# Patient Record
Sex: FEMALE | Race: OTHER | HISPANIC OR LATINO
[De-identification: names, ages, dates, MRNs, and addresses within clinical notes are randomized per-mention and may not be internally consistent; named-entity substitution may affect disease eponyms.]

---

## 2018-11-05 ENCOUNTER — APPOINTMENT (OUTPATIENT)
Dept: NEUROSURGERY | Facility: CLINIC | Age: 38
End: 2018-11-05

## 2022-02-28 ENCOUNTER — EMERGENCY (EMERGENCY)
Facility: HOSPITAL | Age: 42
LOS: 1 days | Discharge: HOME | End: 2022-02-28
Attending: STUDENT IN AN ORGANIZED HEALTH CARE EDUCATION/TRAINING PROGRAM | Admitting: STUDENT IN AN ORGANIZED HEALTH CARE EDUCATION/TRAINING PROGRAM
Payer: MEDICAID

## 2022-02-28 VITALS
OXYGEN SATURATION: 100 % | DIASTOLIC BLOOD PRESSURE: 69 MMHG | SYSTOLIC BLOOD PRESSURE: 132 MMHG | HEART RATE: 73 BPM | TEMPERATURE: 98 F | RESPIRATION RATE: 18 BRPM

## 2022-02-28 PROCEDURE — 73030 X-RAY EXAM OF SHOULDER: CPT | Mod: 26,RT

## 2022-02-28 PROCEDURE — 73080 X-RAY EXAM OF ELBOW: CPT | Mod: 26,RT

## 2022-02-28 PROCEDURE — 73060 X-RAY EXAM OF HUMERUS: CPT | Mod: 26,RT

## 2022-02-28 PROCEDURE — 99284 EMERGENCY DEPT VISIT MOD MDM: CPT

## 2022-02-28 RX ORDER — KETOROLAC TROMETHAMINE 30 MG/ML
30 SYRINGE (ML) INJECTION ONCE
Refills: 0 | Status: DISCONTINUED | OUTPATIENT
Start: 2022-02-28 | End: 2022-02-28

## 2022-02-28 RX ORDER — CYCLOBENZAPRINE HYDROCHLORIDE 10 MG/1
10 TABLET, FILM COATED ORAL ONCE
Refills: 0 | Status: COMPLETED | OUTPATIENT
Start: 2022-02-28 | End: 2022-02-28

## 2022-02-28 RX ORDER — LIDOCAINE 4 G/100G
1 CREAM TOPICAL ONCE
Refills: 0 | Status: COMPLETED | OUTPATIENT
Start: 2022-02-28 | End: 2022-02-28

## 2022-02-28 RX ADMIN — Medication 30 MILLIGRAM(S): at 12:24

## 2022-02-28 RX ADMIN — LIDOCAINE 1 PATCH: 4 CREAM TOPICAL at 12:23

## 2022-02-28 RX ADMIN — CYCLOBENZAPRINE HYDROCHLORIDE 10 MILLIGRAM(S): 10 TABLET, FILM COATED ORAL at 12:24

## 2022-02-28 NOTE — ED PROVIDER NOTE - PATIENT PORTAL LINK FT
You can access the FollowMyHealth Patient Portal offered by [facility] by registering at the following website: [email or web address]. By joining [facility]’s FollowMyHealth portal, you will also be able to view your health information using other applications (apps) compatible with our system.

## 2022-02-28 NOTE — ED PROVIDER NOTE - PROGRESS NOTE DETAILS
improving, no acute findings on xr improving, no acute findings on xr  given sling for comfort and ortho fu

## 2022-02-28 NOTE — ED ADULT TRIAGE NOTE - CHIEF COMPLAINT QUOTE
Pt c/o right elbow and right shoulder pain with LROM since yesterday s/p mechanical fall, + capp refill noted. Denies PMH

## 2022-02-28 NOTE — ED PROVIDER NOTE - OBJECTIVE STATEMENT
40yo F otherwise healthy pw right shoulder pain after fall yesterday. pt tripped and fell and broke her fall by putting her arm behind her. now with pain in shoulder and diminished ROM of arm. took tylenol and motrin at 4am

## 2022-02-28 NOTE — ED PROVIDER NOTE - PHYSICAL EXAMINATION
Gen:  Well appearning in NAD  Head:  NC/AT  Resp: No distress   Ext: no deformities, + tenderness right shoulder, no deformity, + limited rom, can fully zamarripa elbow, + radial pulses, + sensatio intact, no wrist or hand tenderness   Skin: warm and dry as visualized

## 2022-02-28 NOTE — ED ADULT NURSE NOTE - OBJECTIVE STATEMENT
patient A&Ox4, ambulatory c/o R shoulder pain that began yesterday after a fall. patient states she tripped and put her arm behind her to catch her fall. patient has no PMH. patient appears uncomfortable. medication given and tolerated well per EMAR.

## 2022-03-03 PROBLEM — Z00.00 ENCOUNTER FOR PREVENTIVE HEALTH EXAMINATION: Status: ACTIVE | Noted: 2022-03-03

## 2022-03-08 ENCOUNTER — NON-APPOINTMENT (OUTPATIENT)
Age: 42
End: 2022-03-08

## 2022-03-08 ENCOUNTER — APPOINTMENT (OUTPATIENT)
Dept: ORTHOPEDIC SURGERY | Facility: CLINIC | Age: 42
End: 2022-03-08
Payer: MEDICAID

## 2022-03-08 VITALS
HEART RATE: 76 BPM | SYSTOLIC BLOOD PRESSURE: 116 MMHG | BODY MASS INDEX: 28.66 KG/M2 | WEIGHT: 146 LBS | HEIGHT: 60 IN | DIASTOLIC BLOOD PRESSURE: 75 MMHG

## 2022-03-08 PROCEDURE — 73030 X-RAY EXAM OF SHOULDER: CPT | Mod: RT

## 2022-03-08 PROCEDURE — 99204 OFFICE O/P NEW MOD 45 MIN: CPT

## 2022-03-31 ENCOUNTER — APPOINTMENT (OUTPATIENT)
Dept: MRI IMAGING | Facility: IMAGING CENTER | Age: 42
End: 2022-03-31
Payer: MEDICAID

## 2022-03-31 ENCOUNTER — OUTPATIENT (OUTPATIENT)
Dept: OUTPATIENT SERVICES | Facility: HOSPITAL | Age: 42
LOS: 1 days | End: 2022-03-31
Payer: MEDICAID

## 2022-03-31 DIAGNOSIS — M75.101 UNSPECIFIED ROTATOR CUFF TEAR OR RUPTURE OF RIGHT SHOULDER, NOT SPECIFIED AS TRAUMATIC: ICD-10-CM

## 2022-03-31 PROCEDURE — 73221 MRI JOINT UPR EXTREM W/O DYE: CPT

## 2022-03-31 PROCEDURE — 73221 MRI JOINT UPR EXTREM W/O DYE: CPT | Mod: 26,RT

## 2022-04-06 ENCOUNTER — NON-APPOINTMENT (OUTPATIENT)
Age: 42
End: 2022-04-06

## 2022-04-08 ENCOUNTER — APPOINTMENT (OUTPATIENT)
Dept: ORTHOPEDIC SURGERY | Facility: CLINIC | Age: 42
End: 2022-04-08
Payer: MEDICAID

## 2022-04-08 PROCEDURE — 99214 OFFICE O/P EST MOD 30 MIN: CPT

## 2022-04-16 ENCOUNTER — OUTPATIENT (OUTPATIENT)
Dept: OUTPATIENT SERVICES | Facility: HOSPITAL | Age: 42
LOS: 1 days | End: 2022-04-16
Payer: MEDICAID

## 2022-04-16 DIAGNOSIS — Z11.52 ENCOUNTER FOR SCREENING FOR COVID-19: ICD-10-CM

## 2022-04-16 DIAGNOSIS — M75.41 IMPINGEMENT SYNDROME OF RIGHT SHOULDER: ICD-10-CM

## 2022-04-16 DIAGNOSIS — M75.21 BICIPITAL TENDINITIS, RIGHT SHOULDER: ICD-10-CM

## 2022-04-16 LAB — SARS-COV-2 RNA SPEC QL NAA+PROBE: SIGNIFICANT CHANGE UP

## 2022-04-16 PROCEDURE — U0003: CPT

## 2022-04-16 PROCEDURE — U0005: CPT

## 2022-04-16 PROCEDURE — C9803: CPT

## 2022-04-17 ENCOUNTER — TRANSCRIPTION ENCOUNTER (OUTPATIENT)
Age: 42
End: 2022-04-17

## 2022-04-18 ENCOUNTER — OUTPATIENT (OUTPATIENT)
Dept: OUTPATIENT SERVICES | Facility: HOSPITAL | Age: 42
LOS: 1 days | End: 2022-04-18
Payer: MEDICAID

## 2022-04-18 ENCOUNTER — APPOINTMENT (OUTPATIENT)
Dept: ORTHOPEDIC SURGERY | Facility: HOSPITAL | Age: 42
End: 2022-04-18

## 2022-04-18 ENCOUNTER — TRANSCRIPTION ENCOUNTER (OUTPATIENT)
Age: 42
End: 2022-04-18

## 2022-04-18 VITALS
WEIGHT: 151.9 LBS | RESPIRATION RATE: 16 BRPM | HEART RATE: 68 BPM | DIASTOLIC BLOOD PRESSURE: 65 MMHG | SYSTOLIC BLOOD PRESSURE: 103 MMHG | HEIGHT: 60 IN | OXYGEN SATURATION: 100 % | TEMPERATURE: 98 F

## 2022-04-18 VITALS
SYSTOLIC BLOOD PRESSURE: 113 MMHG | DIASTOLIC BLOOD PRESSURE: 62 MMHG | TEMPERATURE: 98 F | HEART RATE: 94 BPM | RESPIRATION RATE: 14 BRPM | OXYGEN SATURATION: 100 %

## 2022-04-18 DIAGNOSIS — M75.21 BICIPITAL TENDINITIS, RIGHT SHOULDER: ICD-10-CM

## 2022-04-18 DIAGNOSIS — S46.011A STRAIN OF MUSCLE(S) AND TENDON(S) OF THE ROTATOR CUFF OF RIGHT SHOULDER, INITIAL ENCOUNTER: ICD-10-CM

## 2022-04-18 DIAGNOSIS — M75.41 IMPINGEMENT SYNDROME OF RIGHT SHOULDER: ICD-10-CM

## 2022-04-18 PROCEDURE — 29827 SHO ARTHRS SRG RT8TR CUF RPR: CPT | Mod: RT

## 2022-04-18 PROCEDURE — 29826 SHO ARTHRS SRG DECOMPRESSION: CPT | Mod: RT

## 2022-04-18 PROCEDURE — C1713: CPT

## 2022-04-18 DEVICE — ANCHOR SUT FOOTPRINT ULTRA PK: Type: IMPLANTABLE DEVICE | Site: RIGHT | Status: FUNCTIONAL

## 2022-04-18 DEVICE — IMPLANTABLE DEVICE: Type: IMPLANTABLE DEVICE | Site: RIGHT | Status: FUNCTIONAL

## 2022-04-18 RX ORDER — SODIUM CHLORIDE 9 MG/ML
3 INJECTION INTRAMUSCULAR; INTRAVENOUS; SUBCUTANEOUS EVERY 8 HOURS
Refills: 0 | Status: DISCONTINUED | OUTPATIENT
Start: 2022-04-18 | End: 2022-05-02

## 2022-04-18 RX ORDER — OXYCODONE AND ACETAMINOPHEN 5; 325 MG/1; MG/1
1 TABLET ORAL
Qty: 30 | Refills: 0
Start: 2022-04-18

## 2022-04-18 RX ORDER — SENNA PLUS 8.6 MG/1
2 TABLET ORAL
Qty: 28 | Refills: 0
Start: 2022-04-18 | End: 2022-05-01

## 2022-04-18 RX ORDER — CEPHALEXIN 500 MG
1 CAPSULE ORAL
Qty: 9 | Refills: 0
Start: 2022-04-18 | End: 2022-04-20

## 2022-04-18 RX ORDER — OXYCODONE HYDROCHLORIDE 5 MG/1
5 TABLET ORAL ONCE
Refills: 0 | Status: DISCONTINUED | OUTPATIENT
Start: 2022-04-18 | End: 2022-04-18

## 2022-04-18 RX ORDER — RIVAROXABAN 15 MG-20MG
2 KIT ORAL
Qty: 28 | Refills: 0
Start: 2022-04-18 | End: 2022-05-01

## 2022-04-18 RX ORDER — ACETAMINOPHEN 500 MG
1000 TABLET ORAL ONCE
Refills: 0 | Status: COMPLETED | OUTPATIENT
Start: 2022-04-18 | End: 2022-04-18

## 2022-04-18 RX ORDER — SODIUM CHLORIDE 9 MG/ML
1000 INJECTION, SOLUTION INTRAVENOUS
Refills: 0 | Status: DISCONTINUED | OUTPATIENT
Start: 2022-04-18 | End: 2022-05-02

## 2022-04-18 RX ORDER — CHLORHEXIDINE GLUCONATE 213 G/1000ML
1 SOLUTION TOPICAL DAILY
Refills: 0 | Status: DISCONTINUED | OUTPATIENT
Start: 2022-04-18 | End: 2022-05-02

## 2022-04-18 RX ORDER — DOCUSATE SODIUM 100 MG
1 CAPSULE ORAL
Qty: 28 | Refills: 0
Start: 2022-04-18 | End: 2022-05-01

## 2022-04-18 RX ORDER — ONDANSETRON 8 MG/1
4 TABLET, FILM COATED ORAL ONCE
Refills: 0 | Status: COMPLETED | OUTPATIENT
Start: 2022-04-18 | End: 2022-04-18

## 2022-04-18 RX ADMIN — Medication 1000 MILLIGRAM(S): at 16:50

## 2022-04-18 RX ADMIN — OXYCODONE HYDROCHLORIDE 5 MILLIGRAM(S): 5 TABLET ORAL at 16:52

## 2022-04-18 RX ADMIN — Medication 400 MILLIGRAM(S): at 16:25

## 2022-04-18 RX ADMIN — ONDANSETRON 4 MILLIGRAM(S): 8 TABLET, FILM COATED ORAL at 16:52

## 2022-04-18 NOTE — ASU PATIENT PROFILE, ADULT - LANGUAGE ASSISTANCE NEEDED
admitting nurse speaks Bengali SPIKE Whitney/Yes-Patient/Caregiver accepts free interpretation services...

## 2022-04-18 NOTE — BRIEF OPERATIVE NOTE - NSICDXBRIEFPROCEDURE_GEN_ALL_CORE_FT
PROCEDURES:  Decompression of subacromial space with rotator cuff repair 18-Apr-2022 16:15:34 right side Cyril Ward

## 2022-04-18 NOTE — ASU DISCHARGE PLAN (ADULT/PEDIATRIC) - PATIENT EDUCATION MATERIALS PROVIED
given to you by Dr. Puckett before surgery/Provider pre-printed instructions given/Pre-printed instructions given for nerve block

## 2022-04-18 NOTE — ASU DISCHARGE PLAN (ADULT/PEDIATRIC) - NURSING INSTRUCTIONS
******************************************************************************************  You may take TYLENOL (acetaminophen) after ________10:25_____ PM if needed for pain. DO NOT take any additional products containing TYLENOL or ACETAMINOPHEN, such as VICODIN, PERCOCET, NORCO, EXCEDRIN, and any over-the-counter cold medications until this time. DO NOT CONSUME MORE THAN 0828-5980 MG of TYLENOL (acetaminophen) in a 24-hour period.   ******************************************************************************************

## 2022-04-18 NOTE — PRE-ANESTHESIA EVALUATION ADULT - NSANTHOSAYNRD_GEN_A_CORE
No. SONALI screening performed.  STOP BANG Legend: 0-2 = LOW Risk; 3-4 = INTERMEDIATE Risk; 5-8 = HIGH Risk

## 2022-04-18 NOTE — PACU DISCHARGE NOTE - COMMENTS
The patient complained of pain but her arm is numb and immobile.  Oxycodone 5mg was administered and the patient felt better.  During discharge instructions, her left eye was noted to be red and she complained of a foreign body sensation consistent with corneal abrasion.  The PACU nurse noted that she had to be re-directed several times immediately post op to stop rubbing her eyes.    Tetracaine 0.5% was administered to the left eye x1 drop with relief.  Tobramycin ointment was given with instructions for use ("1 drop q4 hours x3 doses, if pain persists, call a private ophthalmologist or [facility] clinic - [phone]")

## 2022-04-18 NOTE — H&P ADULT - NSHPPHYSICALEXAM_GEN_ALL_CORE
Gen: NAD, alert and oriented  Resp: No distress, breathing well on room air  RUE: Skin intact  No obvious deformity  Pain with ROM of shoulder, limited abduction  Motor intact  SILT throughout  2+ radial pulse

## 2022-04-18 NOTE — H&P ADULT - ASSESSMENT
ASSESSMENT/PLAN:   NAM FALLON is a 41yFemale presenting with R shoulder rotator cuff tear, with plan to go to OR for R shoulder arthroscopy    - NPO   - Pain control  - Going to OR for arthroscopy  - NWB in sling post-op   ASSESSMENT/PLAN:   NAM FALLON is a 41yFemale presenting with R shoulder rotator cuff tear, with plan to go to OR for R shoulder arthroscopy    - NPO   - Pain control  - Going to OR for arthroscopy  - NWB in sling post-op      will be on xarelto postop for dvt ppx

## 2022-04-18 NOTE — ASU PATIENT PROFILE, ADULT - FALL HARM RISK - RISK INTERVENTIONS

## 2022-04-18 NOTE — H&P ADULT - HISTORY OF PRESENT ILLNESS
Pt is 41F presenting with shoulder pain after a fall in February. No fractures, but was found to have a full thciness rotator cuff tear. She was scheduled for surgery with Dr. Puckett and presents today for surgery

## 2022-04-18 NOTE — ASU DISCHARGE PLAN (ADULT/PEDIATRIC) - CARE PROVIDER_API CALL
Raul Puckett)  Orthopaedic Surgery  [address] [facility], [address] [place] [address]  Phone: [phone]  Fax: [phone]  Follow Up Time:

## 2022-04-18 NOTE — ASU DISCHARGE PLAN (ADULT/PEDIATRIC) - NS MD DC FALL RISK RISK
For information on Fall & Injury Prevention, visit: [email or web address] OR  [email or web address] OR  https://www.cdc.gov/steadi/patient.html

## 2022-04-18 NOTE — BRIEF OPERATIVE NOTE - OPERATION/FINDINGS
Right shoulder arthroscopy, debridement, subacromial decompression, and rotator cuff repair performed

## 2022-04-18 NOTE — H&P ADULT - NSICDXNOFAMILYHX_GEN_ALL_CORE
Click to add NO pertinent Family History

## 2022-04-27 ENCOUNTER — APPOINTMENT (OUTPATIENT)
Dept: ORTHOPEDIC SURGERY | Facility: CLINIC | Age: 42
End: 2022-04-27
Payer: MEDICAID

## 2022-04-27 VITALS — TEMPERATURE: 97.2 F

## 2022-04-27 DIAGNOSIS — M75.21 BICIPITAL TENDINITIS, RIGHT SHOULDER: ICD-10-CM

## 2022-04-27 DIAGNOSIS — M75.41 IMPINGEMENT SYNDROME OF RIGHT SHOULDER: ICD-10-CM

## 2022-04-27 DIAGNOSIS — S46.011A STRAIN OF MUSCLE(S) AND TENDON(S) OF THE ROTATOR CUFF OF RIGHT SHOULDER, INITIAL ENCOUNTER: ICD-10-CM

## 2022-04-27 PROCEDURE — 99024 POSTOP FOLLOW-UP VISIT: CPT

## 2022-05-03 RX ORDER — ASPIRIN/CALCIUM CARB/MAGNESIUM 324 MG
1 TABLET ORAL
Qty: 14 | Refills: 0
Start: 2022-05-03 | End: 2022-05-16

## 2022-05-06 ENCOUNTER — APPOINTMENT (OUTPATIENT)
Dept: ORTHOPEDIC SURGERY | Facility: CLINIC | Age: 42
End: 2022-05-06

## 2022-05-10 ENCOUNTER — APPOINTMENT (OUTPATIENT)
Dept: ORTHOPEDIC SURGERY | Facility: CLINIC | Age: 42
End: 2022-05-10
Payer: MEDICAID

## 2022-05-10 VITALS
DIASTOLIC BLOOD PRESSURE: 73 MMHG | WEIGHT: 143 LBS | BODY MASS INDEX: 28.07 KG/M2 | HEART RATE: 83 BPM | SYSTOLIC BLOOD PRESSURE: 119 MMHG | HEIGHT: 60 IN

## 2022-05-10 PROCEDURE — 99024 POSTOP FOLLOW-UP VISIT: CPT

## 2022-05-10 NOTE — HISTORY OF PRESENT ILLNESS
[de-identified] : Patient is status post Right Shoulder Arthroscopic surgery on 4-\par  Double row arthroscopic repair\par  [ Subacromial decompression ]\par \par \par The patient is doing well with improved pain \par \par The patient denies shortness of breath, chest pain, numbness tingling, worsening calf pain or swelling.\par \par Right Upper Ext\par \par Incisions are intact\par There is no erythema induration warmth or tenderness about the incisions\par There is mild swelling remaining [ and ecchymosis ]\par Shoulder pendulum motion is stiff\par FF 45\par ER -10\par Elbow ROM is [ full ]\par Motor is intact AIN/PIN/Ulnar/Radial\par Sensory intact median/ulnar/radial distributions\par Palpable radial pulse with brisk capillary refill\par \par \par \par Assessment Plan\par 3 week status post Right Shoulder Arthroscopic surgery on 4-\par  Double row arthroscopic repair\par  [ Subacromial decompression ]\par \par \par Continue postoperative exercises\par \par WB status RUE\par  Continue NWB for 6 weeks total\par  Progress 5lbs WB for weeks 6-12\par  Advance 15lbs WB for months 3-5\par  Activities as tolerated months 5+\par \par start physical therapy\par \par \par Follow up:\par 4 weeks

## 2022-06-21 ENCOUNTER — APPOINTMENT (OUTPATIENT)
Dept: ORTHOPEDIC SURGERY | Facility: CLINIC | Age: 42
End: 2022-06-21
Payer: MEDICAID

## 2022-06-21 DIAGNOSIS — M75.41 IMPINGEMENT SYNDROME OF RIGHT SHOULDER: ICD-10-CM

## 2022-06-21 DIAGNOSIS — M75.21 BICIPITAL TENDINITIS, RIGHT SHOULDER: ICD-10-CM

## 2022-06-21 PROCEDURE — 99024 POSTOP FOLLOW-UP VISIT: CPT

## 2022-06-21 NOTE — HISTORY OF PRESENT ILLNESS
[de-identified] : Patient is status post Right Shoulder Arthroscopic surgery on 4-  Double row arthroscopic repair  [ Subacromial decompression ]   The patient is doing well with improved pain   The patient denies shortness of breath, chest pain, numbness tingling, worsening calf pain or swelling.  Right Upper Ext  Incisions are intact There is no erythema induration warmth or tenderness about the incisions There is mild swelling remaining [ and ecchymosis ] Shoulder pendulum motion is stiff FF 45 ER -10 Elbow ROM is [ full ] Motor is intact AIN/PIN/Ulnar/Radial Sensory intact median/ulnar/radial distributions Palpable radial pulse with brisk capillary refill    Assessment Plan 3 week status post Right Shoulder Arthroscopic surgery on 4-  Double row arthroscopic repair  [ Subacromial decompression ]   Continue postoperative exercises  WB status RUE  Continue NWB for 6 weeks total  Progress 5lbs WB for weeks 6-12  Advance 15lbs WB for months 3-5  Activities as tolerated months 5+  start physical therapy   Follow up: 4 weeks

## 2022-08-19 ENCOUNTER — APPOINTMENT (OUTPATIENT)
Dept: ORTHOPEDIC SURGERY | Facility: CLINIC | Age: 42
End: 2022-08-19

## 2022-08-19 VITALS
SYSTOLIC BLOOD PRESSURE: 115 MMHG | BODY MASS INDEX: 28.07 KG/M2 | DIASTOLIC BLOOD PRESSURE: 72 MMHG | WEIGHT: 143 LBS | HEIGHT: 60 IN

## 2022-08-19 DIAGNOSIS — S46.011A STRAIN OF MUSCLE(S) AND TENDON(S) OF THE ROTATOR CUFF OF RIGHT SHOULDER, INITIAL ENCOUNTER: ICD-10-CM

## 2022-08-19 PROCEDURE — 99212 OFFICE O/P EST SF 10 MIN: CPT

## 2022-08-19 RX ORDER — DICLOFENAC SODIUM 50 MG/1
50 TABLET, DELAYED RELEASE ORAL
Qty: 60 | Refills: 1 | Status: COMPLETED | COMMUNITY
Start: 2022-05-10 | End: 2022-08-19

## 2022-08-19 RX ORDER — DICLOFENAC SODIUM 50 MG/1
50 TABLET, DELAYED RELEASE ORAL
Qty: 60 | Refills: 1 | Status: COMPLETED | COMMUNITY
Start: 2022-06-21 | End: 2022-08-19

## 2022-10-21 ENCOUNTER — APPOINTMENT (OUTPATIENT)
Dept: ORTHOPEDIC SURGERY | Facility: CLINIC | Age: 42
End: 2022-10-21

## 2022-10-21 VITALS
SYSTOLIC BLOOD PRESSURE: 106 MMHG | DIASTOLIC BLOOD PRESSURE: 71 MMHG | BODY MASS INDEX: 28.07 KG/M2 | HEIGHT: 60 IN | WEIGHT: 143 LBS

## 2022-10-21 PROCEDURE — 99213 OFFICE O/P EST LOW 20 MIN: CPT

## 2023-04-21 ENCOUNTER — APPOINTMENT (OUTPATIENT)
Dept: ORTHOPEDIC SURGERY | Facility: CLINIC | Age: 43
End: 2023-04-21
Payer: MEDICAID

## 2023-04-21 VITALS
SYSTOLIC BLOOD PRESSURE: 101 MMHG | HEIGHT: 60 IN | WEIGHT: 150 LBS | HEART RATE: 72 BPM | DIASTOLIC BLOOD PRESSURE: 66 MMHG | BODY MASS INDEX: 29.45 KG/M2

## 2023-04-21 DIAGNOSIS — Z98.890 OTHER SPECIFIED POSTPROCEDURAL STATES: ICD-10-CM

## 2023-04-21 PROCEDURE — 20610 DRAIN/INJ JOINT/BURSA W/O US: CPT | Mod: RT

## 2023-04-21 PROCEDURE — 99212 OFFICE O/P EST SF 10 MIN: CPT | Mod: 25

## 2023-09-29 ENCOUNTER — EMERGENCY (EMERGENCY)
Facility: HOSPITAL | Age: 43
LOS: 1 days | Discharge: HOME | End: 2023-09-29
Admitting: STUDENT IN AN ORGANIZED HEALTH CARE EDUCATION/TRAINING PROGRAM
Payer: MEDICAID

## 2023-09-29 VITALS
OXYGEN SATURATION: 99 % | RESPIRATION RATE: 16 BRPM | TEMPERATURE: 98 F | HEART RATE: 70 BPM | WEIGHT: 154.98 LBS | SYSTOLIC BLOOD PRESSURE: 113 MMHG | DIASTOLIC BLOOD PRESSURE: 64 MMHG

## 2023-09-29 PROCEDURE — 99284 EMERGENCY DEPT VISIT MOD MDM: CPT

## 2023-09-29 RX ORDER — ACETAMINOPHEN 500 MG
1000 TABLET ORAL ONCE
Refills: 0 | Status: COMPLETED | OUTPATIENT
Start: 2023-09-29 | End: 2023-09-29

## 2023-09-29 RX ORDER — MAGNESIUM SULFATE 500 MG/ML
1 VIAL (ML) INJECTION ONCE
Refills: 0 | Status: COMPLETED | OUTPATIENT
Start: 2023-09-29 | End: 2023-09-29

## 2023-09-29 RX ORDER — SODIUM CHLORIDE 9 MG/ML
1000 INJECTION INTRAMUSCULAR; INTRAVENOUS; SUBCUTANEOUS ONCE
Refills: 0 | Status: COMPLETED | OUTPATIENT
Start: 2023-09-29 | End: 2023-09-29

## 2023-09-29 RX ORDER — KETOROLAC TROMETHAMINE 30 MG/ML
15 SYRINGE (ML) INJECTION ONCE
Refills: 0 | Status: DISCONTINUED | OUTPATIENT
Start: 2023-09-29 | End: 2023-09-29

## 2023-09-29 RX ORDER — DIPHENHYDRAMINE HCL 50 MG
25 CAPSULE ORAL ONCE
Refills: 0 | Status: COMPLETED | OUTPATIENT
Start: 2023-09-29 | End: 2023-09-29

## 2023-09-29 RX ADMIN — Medication 300 GRAM(S): at 21:50

## 2023-09-29 RX ADMIN — Medication 25 MILLIGRAM(S): at 21:49

## 2023-09-29 RX ADMIN — Medication 400 MILLIGRAM(S): at 21:50

## 2023-09-29 RX ADMIN — SODIUM CHLORIDE 1000 MILLILITER(S): 9 INJECTION INTRAMUSCULAR; INTRAVENOUS; SUBCUTANEOUS at 21:49

## 2023-09-29 RX ADMIN — Medication 15 MILLIGRAM(S): at 21:50

## 2023-09-29 NOTE — ED PROVIDER NOTE - PHYSICAL EXAMINATION
CONSTITUTIONAL: Well-appearing; well-nourished; in no apparent distress. Non-toxic appearing.   NEURO: Alert, oriented x 3. Gait steady without assistance. No facial droop. Tongue protrudes midline. Strength to upper and lower extremities 5/5. No pronator drip. Finger to nose smooth and accurate b/l.   PSYCH: Mood appropriate. Thought processes intact.   NECK: Supple. FROM.   CARD: Regular rate and rhythm, no murmurs  RESP: No accessory muscle use; breath sounds clear and equal bilaterally; no wheezes, rhonchi, or rales     ABD: Soft; non-distended; non-tender. No guarding or rebound.   MUSCULOSKELETAL/EXTREMITIES: FROM in all four extremities; no extremity edema.  SKIN: Warm; dry; no apparent lesions or exudate

## 2023-09-29 NOTE — ED PROVIDER NOTE - CLINICAL SUMMARY MEDICAL DECISION MAKING FREE TEXT BOX
42-year-old female with no pertinent past medical history presents from home for evaluation of frontal headache for 1 week.  Patient notes that it started gradually and waxes and wanes with use of Tylenol but has not fully resolved.  Patient denies worsening of symptoms with change in position.  Patient denies associated photo/phonophobia, nausea, vomiting, visual changes, weakness, paresthesias, trauma, abdominal pain, nausea, vomiting, fever, chills, or neck stiffness.  Patient denies recent travel other than to the [place] greater than 4 weeks ago.  Patient denies taking medication daily.  No other voiced complaints. Of note, patient states that she had similar migraine about 3 years ago and went to her doctor but was told that it was improved with over-the-counter medications and it did and has only had intermittent headaches since then that usually resolves with over-the-counter medication.  VSS. Exam as noted above; no neuro deficits. Will give migraine protocol consisting of IV Toradol, Benadryl, Ofirmev, and magnesium and reassess.  Follow progress notes, dispo pending.  Low suspicion for TIA/stroke, SAH, malignancy more likely migraine and/or tension type headache.

## 2023-09-29 NOTE — ED PROVIDER NOTE - NSFOLLOWUPCLINICS_GEN_ALL_ED_FT
[facility]  Neurology  [address] [place] [address] [address] [place] [address]  Phone: [phone]  Fax:

## 2023-09-29 NOTE — ED PROVIDER NOTE - OBJECTIVE STATEMENT
42-year-old female with no pertinent past medical history presents from home for evaluation of frontal headache for 1 week.  Patient notes that it started gradually and waxes and wanes with use of Tylenol but has not fully resolved.  Patient denies worsening of symptoms with change in position.  Patient denies associated photo/phonophobia, nausea, vomiting, visual changes, weakness, paresthesias, trauma, abdominal pain, nausea, vomiting, fever, chills, or neck stiffness.  Patient denies recent travel other than to the [place] greater than 4 weeks ago.  Patient denies taking medication daily.  No other voiced complaints.

## 2023-09-29 NOTE — ED ADULT TRIAGE NOTE - CHIEF COMPLAINT QUOTE
pt c/o worsening headache x 1 week. has taken Tylenol and Advil with minimal relief and return of symptoms (last dose at noon). pt c/o throbbing pain behind left eye. denies vision changes, nausea, vomiting, dizziness. denies Phx.

## 2023-09-29 NOTE — ED ADULT NURSE NOTE - NSFALLRISKFACTORS_ED_ALL_ED
repositioned/darkened lights/plan of care explained/po fluids offered/warm blanket provided assisted to bathroom/po fluids offered/repositioned/side rails up/plan of care explained No indicators present

## 2023-09-29 NOTE — ED ADULT NURSE NOTE - OBJECTIVE STATEMENT
pt. received to int. 10B A&Ox4 ambulatory p/w HA. pt. endorsess x1 week of HA not relived since it first started. denies dizziness, blurry vision, lightheadedness. Denies n/v. Denies photophobia, pupils PERRLA. no neurological deficits noted. NAD noted. respirations even and unlabored on RA. 18g placed in the R AC, due meds given, no hcg needed as per ALTAF Call. comfort measures provided. safety precautions maintained.

## 2023-09-29 NOTE — ED PROVIDER NOTE - PROGRESS NOTE DETAILS
ALTAF Call: JENNINGS completely resolved per patient. Sleeping comfortably in no distress; pt and  counseled regarding signs/symptoms to return to the ED. Pt and  verbalized understanding.

## 2023-09-30 VITALS
HEART RATE: 64 BPM | SYSTOLIC BLOOD PRESSURE: 116 MMHG | RESPIRATION RATE: 17 BRPM | OXYGEN SATURATION: 98 % | DIASTOLIC BLOOD PRESSURE: 66 MMHG | TEMPERATURE: 98 F

## 2023-09-30 PROBLEM — Z78.9 OTHER SPECIFIED HEALTH STATUS: Chronic | Status: ACTIVE | Noted: 2022-02-28

## 2023-09-30 RX ORDER — DEXAMETHASONE 0.5 MG/5ML
10 ELIXIR ORAL ONCE
Refills: 0 | Status: COMPLETED | OUTPATIENT
Start: 2023-09-30 | End: 2023-09-30

## 2023-09-30 RX ADMIN — Medication 102 MILLIGRAM(S): at 01:31

## 2023-12-15 ENCOUNTER — EMERGENCY (EMERGENCY)
Facility: HOSPITAL | Age: 43
LOS: 1 days | Discharge: HOME | End: 2023-12-15
Admitting: STUDENT IN AN ORGANIZED HEALTH CARE EDUCATION/TRAINING PROGRAM
Payer: MEDICAID

## 2023-12-15 VITALS
SYSTOLIC BLOOD PRESSURE: 125 MMHG | HEART RATE: 118 BPM | DIASTOLIC BLOOD PRESSURE: 74 MMHG | OXYGEN SATURATION: 97 % | RESPIRATION RATE: 18 BRPM | TEMPERATURE: 102 F

## 2023-12-15 VITALS
OXYGEN SATURATION: 99 % | DIASTOLIC BLOOD PRESSURE: 66 MMHG | SYSTOLIC BLOOD PRESSURE: 114 MMHG | TEMPERATURE: 101 F | HEART RATE: 107 BPM | RESPIRATION RATE: 18 BRPM

## 2023-12-15 LAB
ALBUMIN SERPL ELPH-MCNC: 4.1 G/DL — SIGNIFICANT CHANGE UP (ref 3.3–5)
ALBUMIN SERPL ELPH-MCNC: 4.1 G/DL — SIGNIFICANT CHANGE UP (ref 3.3–5)
ALP SERPL-CCNC: 71 U/L — SIGNIFICANT CHANGE UP (ref 40–120)
ALP SERPL-CCNC: 71 U/L — SIGNIFICANT CHANGE UP (ref 40–120)
ALT FLD-CCNC: 26 U/L — SIGNIFICANT CHANGE UP (ref 4–33)
ALT FLD-CCNC: 26 U/L — SIGNIFICANT CHANGE UP (ref 4–33)
ANION GAP SERPL CALC-SCNC: 12 MMOL/L — SIGNIFICANT CHANGE UP (ref 7–14)
ANION GAP SERPL CALC-SCNC: 12 MMOL/L — SIGNIFICANT CHANGE UP (ref 7–14)
AST SERPL-CCNC: 23 U/L — SIGNIFICANT CHANGE UP (ref 4–32)
AST SERPL-CCNC: 23 U/L — SIGNIFICANT CHANGE UP (ref 4–32)
BASOPHILS # BLD AUTO: 0.03 K/UL — SIGNIFICANT CHANGE UP (ref 0–0.2)
BASOPHILS # BLD AUTO: 0.03 K/UL — SIGNIFICANT CHANGE UP (ref 0–0.2)
BASOPHILS NFR BLD AUTO: 0.5 % — SIGNIFICANT CHANGE UP (ref 0–2)
BASOPHILS NFR BLD AUTO: 0.5 % — SIGNIFICANT CHANGE UP (ref 0–2)
BILIRUB SERPL-MCNC: 0.3 MG/DL — SIGNIFICANT CHANGE UP (ref 0.2–1.2)
BILIRUB SERPL-MCNC: 0.3 MG/DL — SIGNIFICANT CHANGE UP (ref 0.2–1.2)
BUN SERPL-MCNC: 6 MG/DL — LOW (ref 7–23)
BUN SERPL-MCNC: 6 MG/DL — LOW (ref 7–23)
CALCIUM SERPL-MCNC: 8.8 MG/DL — SIGNIFICANT CHANGE UP (ref 8.4–10.5)
CALCIUM SERPL-MCNC: 8.8 MG/DL — SIGNIFICANT CHANGE UP (ref 8.4–10.5)
CHLORIDE SERPL-SCNC: 99 MMOL/L — SIGNIFICANT CHANGE UP (ref 98–107)
CHLORIDE SERPL-SCNC: 99 MMOL/L — SIGNIFICANT CHANGE UP (ref 98–107)
CO2 SERPL-SCNC: 23 MMOL/L — SIGNIFICANT CHANGE UP (ref 22–31)
CO2 SERPL-SCNC: 23 MMOL/L — SIGNIFICANT CHANGE UP (ref 22–31)
CREAT SERPL-MCNC: 0.75 MG/DL — SIGNIFICANT CHANGE UP (ref 0.5–1.3)
CREAT SERPL-MCNC: 0.75 MG/DL — SIGNIFICANT CHANGE UP (ref 0.5–1.3)
EGFR: 102 ML/MIN/1.73M2 — SIGNIFICANT CHANGE UP
EGFR: 102 ML/MIN/1.73M2 — SIGNIFICANT CHANGE UP
EOSINOPHIL # BLD AUTO: 0.03 K/UL — SIGNIFICANT CHANGE UP (ref 0–0.5)
EOSINOPHIL # BLD AUTO: 0.03 K/UL — SIGNIFICANT CHANGE UP (ref 0–0.5)
EOSINOPHIL NFR BLD AUTO: 0.5 % — SIGNIFICANT CHANGE UP (ref 0–6)
EOSINOPHIL NFR BLD AUTO: 0.5 % — SIGNIFICANT CHANGE UP (ref 0–6)
FLUAV AG NPH QL: DETECTED
FLUAV AG NPH QL: DETECTED
FLUBV AG NPH QL: SIGNIFICANT CHANGE UP
FLUBV AG NPH QL: SIGNIFICANT CHANGE UP
GLUCOSE SERPL-MCNC: 96 MG/DL — SIGNIFICANT CHANGE UP (ref 70–99)
GLUCOSE SERPL-MCNC: 96 MG/DL — SIGNIFICANT CHANGE UP (ref 70–99)
HCT VFR BLD CALC: 38.7 % — SIGNIFICANT CHANGE UP (ref 34.5–45)
HCT VFR BLD CALC: 38.7 % — SIGNIFICANT CHANGE UP (ref 34.5–45)
HGB BLD-MCNC: 12.9 G/DL — SIGNIFICANT CHANGE UP (ref 11.5–15.5)
HGB BLD-MCNC: 12.9 G/DL — SIGNIFICANT CHANGE UP (ref 11.5–15.5)
IANC: 4.82 K/UL — SIGNIFICANT CHANGE UP (ref 1.8–7.4)
IANC: 4.82 K/UL — SIGNIFICANT CHANGE UP (ref 1.8–7.4)
IMM GRANULOCYTES NFR BLD AUTO: 0.6 % — SIGNIFICANT CHANGE UP (ref 0–0.9)
IMM GRANULOCYTES NFR BLD AUTO: 0.6 % — SIGNIFICANT CHANGE UP (ref 0–0.9)
LYMPHOCYTES # BLD AUTO: 0.81 K/UL — LOW (ref 1–3.3)
LYMPHOCYTES # BLD AUTO: 0.81 K/UL — LOW (ref 1–3.3)
LYMPHOCYTES # BLD AUTO: 12.4 % — LOW (ref 13–44)
LYMPHOCYTES # BLD AUTO: 12.4 % — LOW (ref 13–44)
MCHC RBC-ENTMCNC: 28.2 PG — SIGNIFICANT CHANGE UP (ref 27–34)
MCHC RBC-ENTMCNC: 28.2 PG — SIGNIFICANT CHANGE UP (ref 27–34)
MCHC RBC-ENTMCNC: 33.3 GM/DL — SIGNIFICANT CHANGE UP (ref 32–36)
MCHC RBC-ENTMCNC: 33.3 GM/DL — SIGNIFICANT CHANGE UP (ref 32–36)
MCV RBC AUTO: 84.7 FL — SIGNIFICANT CHANGE UP (ref 80–100)
MCV RBC AUTO: 84.7 FL — SIGNIFICANT CHANGE UP (ref 80–100)
MONOCYTES # BLD AUTO: 0.8 K/UL — SIGNIFICANT CHANGE UP (ref 0–0.9)
MONOCYTES # BLD AUTO: 0.8 K/UL — SIGNIFICANT CHANGE UP (ref 0–0.9)
MONOCYTES NFR BLD AUTO: 12.3 % — SIGNIFICANT CHANGE UP (ref 2–14)
MONOCYTES NFR BLD AUTO: 12.3 % — SIGNIFICANT CHANGE UP (ref 2–14)
NEUTROPHILS # BLD AUTO: 4.82 K/UL — SIGNIFICANT CHANGE UP (ref 1.8–7.4)
NEUTROPHILS # BLD AUTO: 4.82 K/UL — SIGNIFICANT CHANGE UP (ref 1.8–7.4)
NEUTROPHILS NFR BLD AUTO: 73.7 % — SIGNIFICANT CHANGE UP (ref 43–77)
NEUTROPHILS NFR BLD AUTO: 73.7 % — SIGNIFICANT CHANGE UP (ref 43–77)
NRBC # BLD: 0 /100 WBCS — SIGNIFICANT CHANGE UP (ref 0–0)
NRBC # BLD: 0 /100 WBCS — SIGNIFICANT CHANGE UP (ref 0–0)
NRBC # FLD: 0 K/UL — SIGNIFICANT CHANGE UP (ref 0–0)
NRBC # FLD: 0 K/UL — SIGNIFICANT CHANGE UP (ref 0–0)
PLATELET # BLD AUTO: 242 K/UL — SIGNIFICANT CHANGE UP (ref 150–400)
PLATELET # BLD AUTO: 242 K/UL — SIGNIFICANT CHANGE UP (ref 150–400)
POTASSIUM SERPL-MCNC: 3.7 MMOL/L — SIGNIFICANT CHANGE UP (ref 3.5–5.3)
POTASSIUM SERPL-MCNC: 3.7 MMOL/L — SIGNIFICANT CHANGE UP (ref 3.5–5.3)
POTASSIUM SERPL-SCNC: 3.7 MMOL/L — SIGNIFICANT CHANGE UP (ref 3.5–5.3)
POTASSIUM SERPL-SCNC: 3.7 MMOL/L — SIGNIFICANT CHANGE UP (ref 3.5–5.3)
PROT SERPL-MCNC: 6.7 G/DL — SIGNIFICANT CHANGE UP (ref 6–8.3)
PROT SERPL-MCNC: 6.7 G/DL — SIGNIFICANT CHANGE UP (ref 6–8.3)
RBC # BLD: 4.57 M/UL — SIGNIFICANT CHANGE UP (ref 3.8–5.2)
RBC # BLD: 4.57 M/UL — SIGNIFICANT CHANGE UP (ref 3.8–5.2)
RBC # FLD: 14 % — SIGNIFICANT CHANGE UP (ref 10.3–14.5)
RBC # FLD: 14 % — SIGNIFICANT CHANGE UP (ref 10.3–14.5)
RSV RNA NPH QL NAA+NON-PROBE: SIGNIFICANT CHANGE UP
RSV RNA NPH QL NAA+NON-PROBE: SIGNIFICANT CHANGE UP
SARS-COV-2 RNA SPEC QL NAA+PROBE: SIGNIFICANT CHANGE UP
SARS-COV-2 RNA SPEC QL NAA+PROBE: SIGNIFICANT CHANGE UP
SODIUM SERPL-SCNC: 134 MMOL/L — LOW (ref 135–145)
SODIUM SERPL-SCNC: 134 MMOL/L — LOW (ref 135–145)
WBC # BLD: 6.53 K/UL — SIGNIFICANT CHANGE UP (ref 3.8–10.5)
WBC # BLD: 6.53 K/UL — SIGNIFICANT CHANGE UP (ref 3.8–10.5)
WBC # FLD AUTO: 6.53 K/UL — SIGNIFICANT CHANGE UP (ref 3.8–10.5)
WBC # FLD AUTO: 6.53 K/UL — SIGNIFICANT CHANGE UP (ref 3.8–10.5)

## 2023-12-15 PROCEDURE — 93010 ELECTROCARDIOGRAM REPORT: CPT

## 2023-12-15 PROCEDURE — 71046 X-RAY EXAM CHEST 2 VIEWS: CPT | Mod: 26

## 2023-12-15 PROCEDURE — 99284 EMERGENCY DEPT VISIT MOD MDM: CPT

## 2023-12-15 RX ORDER — SODIUM CHLORIDE 9 MG/ML
1000 INJECTION INTRAMUSCULAR; INTRAVENOUS; SUBCUTANEOUS ONCE
Refills: 0 | Status: COMPLETED | OUTPATIENT
Start: 2023-12-15 | End: 2023-12-15

## 2023-12-15 RX ORDER — ACETAMINOPHEN 500 MG
650 TABLET ORAL ONCE
Refills: 0 | Status: COMPLETED | OUTPATIENT
Start: 2023-12-15 | End: 2023-12-15

## 2023-12-15 RX ORDER — KETOROLAC TROMETHAMINE 30 MG/ML
30 SYRINGE (ML) INJECTION ONCE
Refills: 0 | Status: DISCONTINUED | OUTPATIENT
Start: 2023-12-15 | End: 2023-12-15

## 2023-12-15 RX ORDER — BENZOCAINE 10 %
1 GEL (GRAM) MUCOUS MEMBRANE
Qty: 30 | Refills: 0
Start: 2023-12-15

## 2023-12-15 RX ORDER — IBUPROFEN 200 MG
1 TABLET ORAL
Qty: 25 | Refills: 0
Start: 2023-12-15

## 2023-12-15 RX ADMIN — SODIUM CHLORIDE 1000 MILLILITER(S): 9 INJECTION INTRAMUSCULAR; INTRAVENOUS; SUBCUTANEOUS at 22:07

## 2023-12-15 RX ADMIN — Medication 30 MILLIGRAM(S): at 22:15

## 2023-12-15 RX ADMIN — Medication 650 MILLIGRAM(S): at 22:08

## 2023-12-15 NOTE — ED PROVIDER NOTE - WR ORDER ID 1
Vaccine Information Statement(s) for Influenza (Inactivated) given and reviewed, questions answered, verbal consent given by Patient for injection(s) administered.     [id number]

## 2023-12-15 NOTE — ED ADULT NURSE NOTE - OBJECTIVE STATEMENT
Pt is alert and orientedx4, ambulatory at baseline. Pt presents to the ED with flu like symptoms including fevers, chills and body aches. Pt denies chest pain, shortness of breath, dyspnea on exertion, breathing is unlabored and even. Pt denies nausea, vomiting or diarrhea. 20G IV placed in LAC, labs drawn, awaiting further orders. Call bell within reach, bed in lowest position, will continue to monitor.

## 2023-12-15 NOTE — ED ADULT NURSE NOTE - NSFALLUNIVINTERV_ED_ALL_ED
Bed/Stretcher in lowest position, wheels locked, appropriate side rails in place/Call bell, personal items and telephone in reach/Instruct patient to call for assistance before getting out of bed/chair/stretcher/Non-slip footwear applied when patient is off stretcher/[place] to call system/Physically safe environment - no spills, clutter or unnecessary equipment/Purposeful proactive rounding/Room/bathroom lighting operational, light cord in reach Bed/Stretcher in lowest position, wheels locked, appropriate side rails in place/Call bell, personal items and telephone in reach/Instruct patient to call for assistance before getting out of bed/chair/stretcher/Non-slip footwear applied when patient is off stretcher/[place] to call system/Physically safe environment - no spills, clutter or unnecessary equipment/Purposeful proactive rounding/Room/bathroom lighting operational, light cord in reach

## 2023-12-15 NOTE — ED PROVIDER NOTE - CARDIAC, MLM
Per response from , Phentermine is not covered for patients with BMI of less than 30.   Normal rate, regular rhythm.  Heart sounds S1, S2.  No murmurs, rubs or gallops.

## 2023-12-15 NOTE — ED PROVIDER NOTE - NSFOLLOWUPINSTRUCTIONS_ED_ALL_ED_FT
Gripe en los adultos  Influenza, Adult  La gripe, también llamada “influenza”, es krunal infección viral que afecta principalmente las vías respiratorias. [place] incluye los pulmones, la nariz y la garganta. Se transmite fácilmente de persona a persona (es contagiosa). Provoca síntomas del resfrío común, junto con fiebre lore y dolor corporal.    ¿Cuáles son las causas?  La causa de esta afección es el virus de la influenza. Puede contraer el virus de las siguientes maneras:  Al inhalar las gotitas que están en el aire liberadas por la tos o el estornudo de krunal persona infectada.  Tocar algo que tiene el virus (se ha contaminado) y luego tocarse la boca, la nariz o los ojos.  ¿Qué incrementa el riesgo?  Los siguientes factores pueden hacer que sea propenso a contraer la gripe:  No lavarse o desinfectarse las mirela con frecuencia.  Tener contacto cercano con muchas personas armaan la temporada de resfrío y gripe.  Tocarse la boca, los ojos o la nariz sin antes lavarse ni desinfectarse las mirela.  No recibir la vacuna anual contra la gripe.  Puede correr un mayor riesgo de tener gripe, incluso problemas graves, [place] krunal infección pulmonar (neumonía), si:  Es mayor de 65 años de edad.  Está embarazada.  Tiene debilitado el sistema que combate las enfermedades (sistema inmunitario). [place] incluye a personas que tienen VIH o síndrome de inmunodeficiencia adquirida (SIDA), están recibiendo quimioterapia o están tomando medicamentos que reducen (suprimen) el sistema inmunitario.  Tiene krunal enfermedad a karri plazo (crónica), [place] krunal enfermedad cardíaca, enfermedad renal, diabetes o enfermedad pulmonar.  Tiene un trastorno hepático.  Tiene mucho sobrepeso (obesidad mórbida).  Tiene anemia.  Tiene asma.  ¿Cuáles son los signos o síntomas?  Los síntomas de esta afección por lo general comienzan de repente y garcia entre 4 y 14 días. Estos pueden incluir:  Fiebre y escalofríos.  Roz de opal, roz en el cuerpo o roz musculares.  Dolor de garganta.  Tos.  Secreción o congestión nasal.  Malestar en el pecho.  Falta de apetito.  Debilidad o fatiga.  Mareos.  Náuseas o vómitos.  ¿Cómo se diagnostica?  Esta afección se puede diagnosticar en función de lo siguiente:  Los síntomas y los antecedentes médicos.  Un examen físico.  Un hisopado de nariz o garganta y el análisis del líquido extraído para detectar el virus de la gripe.  ¿Cómo se trata?  Si la gripe se diagnostica pronto, se le puede tratar con un medicamento antiviral que se administra por vía oral (por la boca) o por vía intravenosa (i.v.). [place] puede ayudar a reducir la gravedad de la enfermedad y cuánto dura.    Cuidarse en cadet hogar también puede ayudar a aliviar los síntomas. El médico puede recomendarle lo siguiente:  Usar medicamentos de venta aneta.  Beber mucho líquido.  En muchos casos, la gripe desaparece ashwin. Si tiene síntomas graves o complicaciones, puede tratarse en un hospital.    Siga estas instrucciones en cadet casa:  Actividad    Descanse según sea necesario y duerma donnie.  Quédese en cadet casa y no concurra al trabajo o a la escuela [place] se lo haya indicado cadet médico. A menos que visite al médico, evite salir de cadet casa hasta que la fiebre haya desaparecido por 24 horas sin porsche medicamentos.  Comida y bebida    [place] krunal solución de rehidratación oral (SRO). Esta es krunal bebida que se vende en farmacias y tiendas minoristas.  [place] suficiente líquido kh[place] para mantener la orina de color amarillo pálido.  En la medida en que pueda, olivia líquidos transparentes en pequeñas cantidades. Los líquidos transparentes incluyen agua, cubitos de hielo, jugo de fruta rebajado con agua y bebidas deportivas bajas en calorías.  En la medida en que pueda, consuma alimentos blandos y fáciles de digerir en pequeñas cantidades. Estos alimentos incluyen bananas, compota de manzana, arroz, teresa magras, tostadas y galletas saladas.  Evite consumir líquidos que contengan mucha azúcar o cafeína, [place] bebidas energéticas, bebidas deportivas comunes y refrescos.  Evite porsche alcohol.  Evite los alimentos condimentados o con alto contenido de grasa.  Indicaciones generales        Use los medicamentos de venta aneta y los recetados solamente kh[place] se lo haya indicado el médico.  Use un humidificador de aire frío para agregar humedad al aire de cadet casa. [place] puede facilitar la respiración.  Cuando utilice un humidificador de vapor frío, límpielo a diario. Vacíe el agua y cámbiela por agua limpia.  Al toser o estornudar, cúbrase la boca y la nariz.  Lávese las mirela frecuentemente con agua y jabón y armaan al menos 20 segundos, en especial después de toser o estornudar. Use desinfectante para mirela con alcohol si no dispone de agua y jabón.  Cumpla con todas las visitas de seguimiento. [place] es importante.  ¿Cómo se previene?    Colóquese la vacuna anual contra la gripe. Esta suele estar disponible a finales del verano, en el otoño o en el invierno. Pregúntele al médico cuándo debe colocarse la vacuna contra la gripe.  Evite el contacto con personas que están enfermas armaan la temporada de resfrío y gripe. Generalmente es armaan el otoño y el invierno.  Comuníquese con un médico si:  Tiene nuevos síntomas.  Tiene los siguientes síntomas:  Dolor de pecho.  Diarrea.  Fiebre.  La tos empeora.  Produce más mucosidad.  Siente náuseas o vomita.  Solicite ayuda de inmediato si:  Presenta falta el aire o dificultad para respirar.  La piel o las uñas se ponen de un color azulado.  Presenta dolor intenso o rigidez en el odalis.  Siente un dolor repentino en la opal, la gray o el oído.  No puede comer ni beber sin vomitar.  Estos síntomas pueden representar un problema grave que constituye krunal emergencia. No espere a jeanine si los síntomas desaparecen. Solicite atención médica de inmediato. Comuníquese con el servicio de emergencias de cadet localidad (911 en [place]). No conduzca por tho propios medios hasta el hospital.    Resumen  La gripe, también llamada “influenza”, es krunal infección viral que afecta principalmente las vías respiratorias.  Los síntomas de la gripe normalmente comienzan de repente y garcia entre 4 y 14 días.  Colocarse la vacuna anual contra la gripe es la mejor manera de prevenir el contagio de la gripe.  Quédese en cadet casa y no concurra al trabajo o a la escuela khur[place] se lo haya indicado cadet médico. A menos que visite al médico, evite salir de cadet casa hasta que la fiebre haya desaparecido por 24 horas sin porsche medicamentos.  Cumpla con todas las visitas de seguimiento. [place] es importante.  Esta información no tiene [place] fin reemplazar el consejo del médico. Asegúrese de hacerle al médico cualquier pregunta que tenga. Gripe en los adultos  Influenza, Adult  La gripe, también llamada “influenza”, es krunal infección viral que afecta principalmente las vías respiratorias. [place] incluye los pulmones, la nariz y la garganta. Se transmite fácilmente de persona a persona (es contagiosa). Provoca síntomas del resfrío común, junto con fiebre lore y dolor corporal.    ¿Cuáles son las causas?  La causa de esta afección es el virus de la influenza. Puede contraer el virus de las siguientes maneras:  Al inhalar las gotitas que están en el aire liberadas por la tos o el estornudo de krunal persona infectada.  Tocar algo que tiene el virus (se ha contaminado) y luego tocarse la boca, la nariz o los ojos.  ¿Qué incrementa el riesgo?  Los siguientes factores pueden hacer que sea propenso a contraer la gripe:  No lavarse o desinfectarse las mirela con frecuencia.  Tener contacto cercano con muchas personas armaan la temporada de resfrío y gripe.  Tocarse la boca, los ojos o la nariz sin antes lavarse ni desinfectarse las mirela.  No recibir la vacuna anual contra la gripe.  Puede correr un mayor riesgo de tener gripe, incluso problemas graves, [place] krunal infección pulmonar (neumonía), si:  Es mayor de 65 años de edad.  Está embarazada.  Tiene debilitado el sistema que combate las enfermedades (sistema inmunitario). [place] incluye a personas que tienen VIH o síndrome de inmunodeficiencia adquirida (SIDA), están recibiendo quimioterapia o están tomando medicamentos que reducen (suprimen) el sistema inmunitario.  Tiene krunal enfermedad a karri plazo (crónica), [place] krunal enfermedad cardíaca, enfermedad renal, diabetes o enfermedad pulmonar.  Tiene un trastorno hepático.  Tiene mucho sobrepeso (obesidad mórbida).  Tiene anemia.  Tiene asma.  ¿Cuáles son los signos o síntomas?  Los síntomas de esta afección por lo general comienzan de repente y garcia entre 4 y 14 días. Estos pueden incluir:  Fiebre y escalofríos.  Roz de opal, roz en el cuerpo o roz musculares.  Dolor de garganta.  Tos.  Secreción o congestión nasal.  Malestar en el pecho.  Falta de apetito.  Debilidad o fatiga.  Mareos.  Náuseas o vómitos.  ¿Cómo se diagnostica?  Esta afección se puede diagnosticar en función de lo siguiente:  Los síntomas y los antecedentes médicos.  Un examen físico.  Un hisopado de nariz o garganta y el análisis del líquido extraído para detectar el virus de la gripe.  ¿Cómo se trata?  Si la gripe se diagnostica pronto, se le puede tratar con un medicamento antiviral que se administra por vía oral (por la boca) o por vía intravenosa (i.v.). [place] puede ayudar a reducir la gravedad de la enfermedad y cuánto dura.    Cuidarse en cadet hogar también puede ayudar a aliviar los síntomas. El médico puede recomendarle lo siguiente:  Usar medicamentos de venta aneta.  Beber mucho líquido.  En muchos casos, la gripe desaparece ashwin. Si tiene síntomas graves o complicaciones, puede tratarse en un hospital.    Siga estas instrucciones en cadet casa:  Actividad    Descanse según sea necesario y duerma donnie.  Quédese en cadet casa y no concurra al trabajo o a la escuela khur[place] se lo haya indicado cadet médico. A menos que visite al médico, evite salir de cadet casa hasta que la fiebre haya desaparecido por 24 horas sin porsche medicamentos.  Comida y bebida    [place] krunal solución de rehidratación oral (SRO). Esta es krunal bebida que se vende en farmacias y tiendas minoristas.  [place]livia suficiente líquido kh[place] para mantener la orina de color amarillo pálido.  En la medida en que pueda, olivia líquidos transparentes en pequeñas cantidades. Los líquidos transparentes incluyen agua, cubitos de hielo, jugo de fruta rebajado con agua y bebidas deportivas bajas en calorías.  En la medida en que pueda, consuma alimentos blandos y fáciles de digerir en pequeñas cantidades. Estos alimentos incluyen bananas, compota de manzana, arroz, teresa magras, tostadas y galletas saladas.  Evite consumir líquidos que contengan mucha azúcar o cafeína, [place] bebidas energéticas, bebidas deportivas comunes y refrescos.  Evite porsche alcohol.  Evite los alimentos condimentados o con alto contenido de grasa.  Indicaciones generales        Use los medicamentos de venta aneta y los recetados solamente [place] se lo haya indicado el médico.  Use un humidificador de aire frío para agregar humedad al aire de cadet casa. [place] puede facilitar la respiración.  Cuando utilice un humidificador de vapor frío, límpielo a diario. Vacíe el agua y cámbiela por agua limpia.  Al toser o estornudar, cúbrase la boca y la nariz.  Lávese las mirela frecuentemente con agua y jabón y armaan al menos 20 segundos, en especial después de toser o estornudar. Use desinfectante para mirela con alcohol si no dispone de agua y jabón.  Cumpla con todas las visitas de seguimiento. [place] es importante.  ¿Cómo se previene?    Colóquese la vacuna anual contra la gripe. Esta suele estar disponible a finales del verano, en el otoño o en el invierno. Pregúntele al médico cuándo debe colocarse la vacuna contra la gripe.  Evite el contacto con personas que están enfermas armaan la temporada de resfrío y gripe. Generalmente es armaan el otoño y el invierno.  Comuníquese con un médico si:  Tiene nuevos síntomas.  Tiene los siguientes síntomas:  Dolor de pecho.  Diarrea.  Fiebre.  La tos empeora.  Produce más mucosidad.  Siente náuseas o vomita.  Solicite ayuda de inmediato si:  Presenta falta el aire o dificultad para respirar.  La piel o las uñas se ponen de un color azulado.  Presenta dolor intenso o rigidez en el odalis.  Siente un dolor repentino en la opal, la gray o el oído.  No puede comer ni beber sin vomitar.  Estos síntomas pueden representar un problema grave que constituye krunal emergencia. No espere a jeanine si los síntomas desaparecen. Solicite atención médica de inmediato. Comuníquese con el servicio de emergencias de cadet localidad (911 en [place]). No conduzca por tho propios medios hasta el hospital.    Resumen  La gripe, también llamada “influenza”, es krunal infección viral que afecta principalmente las vías respiratorias.  Los síntomas de la gripe normalmente comienzan de repente y garcia entre 4 y 14 días.  Colocarse la vacuna anual contra la gripe es la mejor manera de prevenir el contagio de la gripe.  Quédese en cadet casa y no concurra al trabajo o a la escuela khurram se lo haya indicado cadet médico. A menos que visite al médico, evite salir de cadet casa hasta que la fiebre haya desaparecido por 24 horas sin porsche medicamentos.  Cumpla con todas las visitas de seguimiento. [place] es importante.  Esta información no tiene [place] fin reemplazar el consejo del médico. Asegúrese de hacerle al médico cualquier pregunta que tenga.

## 2023-12-15 NOTE — ED PROVIDER NOTE - PATIENT PORTAL LINK FT
You can access the FollowMyHealth Patient Portal offered by [facility] by registering at the following website: [email or web address]. By joining [employer]’s FollowMyHealth portal, you will also be able to view your health information using other applications (apps) compatible with our system. You can access the FollowMyHealth Patient Portal offered by [facility] by registering at the following website: [email or web address]. By joining [employer]’s FollowMyHealth portal, you will also be able to view your health information using other applications (apps) compatible with our system.

## 2023-12-15 NOTE — ED PROVIDER NOTE - OBJECTIVE STATEMENT
Patient is a 43-year-old female with no significant past medical history presenting with complaint of subjective fever and chills, myalgias, cough, sore throat x 2 days.  She denies recent travel, sick contacts prior to the onset of symptoms.  No associated chest pain, shortness of breath, headache, neck stiffness, rash abdominal pain, nausea, vomiting.

## 2023-12-15 NOTE — ED PROVIDER NOTE - CLINICAL SUMMARY MEDICAL DECISION MAKING FREE TEXT BOX
Patient is a 43-year-old female with no significant past medical history presenting with complaint of subjective fever and chills, myalgias, cough, sore throat x 2 days.

## 2024-05-05 NOTE — ED ADULT NURSE NOTE - IS THE PATIENT ABLE TO BE SCREENED?
"End of Shift Summary  For vital signs and complete assessments, please see documentation flowsheets.     Pertinent assessments: POD#2, alert and oriented x4, VSS, afebrile, room air, independent in room, Midline abd incision---covered with dressing---CDI, wearing abd binder for comfort, PRN Oxycodone for pain control as well as ice pack, denies pain/nausea. BS hypo, no flatus and no bowel movement post op    Major Shift Events : slept well between cares     Treatment Plan: Monitor labs, Pain and symptom management.     Bedside Nurse: Sanjana Slaughter RN               Problem: Adult Inpatient Plan of Care  Goal: Plan of Care Review  Description: The Plan of Care Review/Shift note should be completed every shift.  The Outcome Evaluation is a brief statement about your assessment that the patient is improving, declining, or no change.  This information will be displayed automatically on your shift  note.  Outcome: Progressing  Flowsheets (Taken 5/5/2024 2387)  Outcome Evaluation: walked halls x1, pain controlled with oxy  Plan of Care Reviewed With: patient  Overall Patient Progress: improving  Goal: Patient-Specific Goal (Individualized)  Description: You can add care plan individualizations to a care plan. Examples of Individualization might be:  \"Parent requests to be called daily at 9am for status\", \"I have a hard time hearing out of my right ear\", or \"Do not touch me to wake me up as it startles  me\".  Outcome: Progressing  Goal: Absence of Hospital-Acquired Illness or Injury  Outcome: Progressing  Intervention: Identify and Manage Fall Risk  Recent Flowsheet Documentation  Taken 5/4/2024 8318 by Sanjana Slaughter RN  Safety Promotion/Fall Prevention:   activity supervised   assistive device/personal items within reach  Intervention: Prevent Skin Injury  Recent Flowsheet Documentation  Taken 5/4/2024 2338 by Sanjana Slaughter RN  Body Position: position changed independently  Intervention: Prevent and Manage VTE " (Venous Thromboembolism) Risk  Recent Flowsheet Documentation  Taken 5/4/2024 2338 by Sanjana Slaughter RN  VTE Prevention/Management: SCDs (sequential compression devices) off  Goal: Optimal Comfort and Wellbeing  Outcome: Progressing  Intervention: Monitor Pain and Promote Comfort  Recent Flowsheet Documentation  Taken 5/5/2024 0205 by Sanjana Slaughter RN  Pain Management Interventions: medication (see MAR)  Taken 5/4/2024 2315 by Sanjana Slaughter RN  Pain Management Interventions: cold applied  Goal: Readiness for Transition of Care  Outcome: Progressing   Goal Outcome Evaluation:      Plan of Care Reviewed With: patient    Overall Patient Progress: improvingOverall Patient Progress: improving    Outcome Evaluation: walked halls x1, pain controlled with oxy       Yes

## 2024-05-22 ENCOUNTER — APPOINTMENT (OUTPATIENT)
Dept: INTERNAL MEDICINE | Facility: CLINIC | Age: 44
End: 2024-05-22

## 2024-05-24 ENCOUNTER — APPOINTMENT (OUTPATIENT)
Dept: INTERNAL MEDICINE | Facility: CLINIC | Age: 44
End: 2024-05-24
Payer: MEDICAID

## 2024-05-24 ENCOUNTER — NON-APPOINTMENT (OUTPATIENT)
Age: 44
End: 2024-05-24

## 2024-05-24 VITALS
WEIGHT: 151 LBS | HEART RATE: 70 BPM | BODY MASS INDEX: 29.64 KG/M2 | HEIGHT: 60 IN | SYSTOLIC BLOOD PRESSURE: 99 MMHG | DIASTOLIC BLOOD PRESSURE: 62 MMHG | OXYGEN SATURATION: 97 %

## 2024-05-24 DIAGNOSIS — L65.9 NONSCARRING HAIR LOSS, UNSPECIFIED: ICD-10-CM

## 2024-05-24 DIAGNOSIS — M25.512 PAIN IN LEFT SHOULDER: ICD-10-CM

## 2024-05-24 DIAGNOSIS — E66.3 OVERWEIGHT: ICD-10-CM

## 2024-05-24 DIAGNOSIS — Z00.00 ENCOUNTER FOR GENERAL ADULT MEDICAL EXAMINATION W/OUT ABNORMAL FINDINGS: ICD-10-CM

## 2024-05-24 DIAGNOSIS — M79.645 PAIN IN LEFT FINGER(S): ICD-10-CM

## 2024-05-24 DIAGNOSIS — Z86.018 PERSONAL HISTORY OF OTHER BENIGN NEOPLASM: ICD-10-CM

## 2024-05-24 DIAGNOSIS — Z78.9 OTHER SPECIFIED HEALTH STATUS: ICD-10-CM

## 2024-05-24 PROCEDURE — 93000 ELECTROCARDIOGRAM COMPLETE: CPT | Mod: 59

## 2024-05-24 PROCEDURE — 99386 PREV VISIT NEW AGE 40-64: CPT | Mod: 25

## 2024-05-24 RX ORDER — DICLOFENAC SODIUM 50 MG/1
50 TABLET, DELAYED RELEASE ORAL
Qty: 60 | Refills: 1 | Status: DISCONTINUED | COMMUNITY
Start: 2022-03-08 | End: 2024-05-24

## 2024-05-24 RX ORDER — DICLOFENAC SODIUM 3 G/100G
3 GEL TOPICAL TWICE DAILY
Qty: 1 | Refills: 0 | Status: ACTIVE | COMMUNITY
Start: 2024-05-24 | End: 1900-01-01

## 2024-05-24 RX ORDER — ASPIRIN 325 MG/1
325 TABLET, FILM COATED ORAL
Qty: 14 | Refills: 0 | Status: DISCONTINUED | COMMUNITY
Start: 2022-04-27 | End: 2024-05-24

## 2024-05-24 RX ORDER — MELOXICAM 15 MG/1
15 TABLET ORAL
Qty: 30 | Refills: 1 | Status: DISCONTINUED | COMMUNITY
Start: 2022-08-19 | End: 2024-05-24

## 2024-05-24 RX ORDER — DICLOFENAC SODIUM 50 MG/1
50 TABLET, DELAYED RELEASE ORAL
Qty: 60 | Refills: 1 | Status: DISCONTINUED | COMMUNITY
Start: 2022-04-27 | End: 2024-05-24

## 2024-05-24 NOTE — HISTORY OF PRESENT ILLNESS
[FreeTextEntry1] : Hair loss, left shoulder pain and left 3rd digit finger pain [de-identified] : 43 F presenting for a CPE. Her top concerns today include hair loss and left shoulder/hand pain. She noticed hair loss about 6 months ago. She did not previously have hair loss. She denies noticing any bald patches in her scalp. She notices clumps of hair falling out. She denies any pain associated with the hair loss. Denies having any major surgeries or psychological stressors in the past 6 months. She is currently taking biotin. Furthermore, she also reports having left shoulder pain. She cannot lift the shoulder above 90 degrees without significant pain. She also states that occasionally her 3rd/4th digits of the left hand are numb in the AM upon awakening and she has pain in the middle joint of the 3rd digit. She denies AM stiffness, warmth or redness.

## 2024-05-24 NOTE — HEALTH RISK ASSESSMENT
[Good] : ~his/her~  mood as  good [Yes] : Yes [Monthly or less (1 pt)] : Monthly or less (1 point) [1 or 2 (0 pts)] : 1 or 2 (0 points) [Less than monthly (1 pt)] : Less than monthly (1 point) [No] : In the past 12 months have you used drugs other than those required for medical reasons? No [No falls in past year] : Patient reported no falls in the past year [Little interest or pleasure doing things] : 1) Little interest or pleasure doing things [Feeling down, depressed, or hopeless] : 2) Feeling down, depressed, or hopeless [0] : 2) Feeling down, depressed, or hopeless: Not at all (0) [HIV Test offered] : HIV Test offered [Hepatitis C test offered] : Hepatitis C test offered [None] : None [With Family] : lives with family [Employed] : employed [] :  [# Of Children ___] : has [unfilled] children [Sexually Active] : sexually active [Feels Safe at Home] : Feels safe at home [Smoke Detector] : smoke detector [Carbon Monoxide Detector] : carbon monoxide detector [Safety elements used in home] : safety elements used in home [Seat Belt] :  uses seat belt [Sunscreen] : uses sunscreen [Never] : Never [PHQ-2 Negative - No further assessment needed] : PHQ-2 Negative - No further assessment needed [FreeTextEntry1] : pain in middle left finger and hair loss [de-identified] : No [de-identified] : No [de-identified] : Social Use [Audit-CScore] : 2 [ESV5Arclb] : 0 [Change in mental status noted] : No change in mental status noted [Language] : denies difficulty with language [Behavior] : denies difficulty with behavior [Learning/Retaining New Information] : denies difficulty learning/retaining new information [Handling Complex Tasks] : denies difficulty handling complex tasks [Reasoning] : denies difficulty with reasoning [Spatial Ability and Orientation] : denies difficulty with spatial ability and orientation [High Risk Behavior] : no high risk behavior [Reports changes in hearing] : Reports no changes in hearing [Reports changes in vision] : Reports no changes in vision [Guns at Home] : no guns at home [Travel to Developing Areas] : does not  travel to developing areas [TB Exposure] : is not being exposed to tuberculosis [Caregiver Concerns] : does not have caregiver concerns

## 2024-05-24 NOTE — PHYSICAL EXAM
[Normal Sclera/Conjunctiva] : normal sclera/conjunctiva [EOMI] : extraocular movements intact [Thyroid Normal, No Nodules] : the thyroid was normal and there were no nodules present [No Edema] : there was no peripheral edema [Normal] : no rash [de-identified] : Poor ROM of left shoulder, normal sensation of left hand digits, normal strength; positive job test on the left shoulder [de-identified] : Thinning of the scalp noted; no patches noted.

## 2024-05-24 NOTE — REVIEW OF SYSTEMS
[Joint Pain] : joint pain [Hair Changes] : hair changes [Negative] : Heme/Lymph [FreeTextEntry9] : Left shoulder pain; left finger pain

## 2024-05-24 NOTE — COUNSELING
[Potential consequences of obesity discussed] : Potential consequences of obesity discussed [Benefits of weight loss discussed] : Benefits of weight loss discussed [Encouraged to increase physical activity] : Encouraged to increase physical activity [None] : None [FreeTextEntry4] : 20

## 2024-05-24 NOTE — PLAN
[FreeTextEntry1] : #HCM - CBC with differential, HIV, Hep C, CMP, hemoglobin A1c, vitamin B12, vitamin D, urine analysis, lipid panel, TFTs all ordered. Patient to be informed of results. - The patient has screened negative for depression and excessive alcohol use. - The patient has never used tobacco products. - The [place] Preventive Service Task Force recommends yearly lung cancer screening with LDCT for people who have a 20 pack-year or more smoking history and smoke now or have quit within the past 15 years and are between 50 and 80 years old. - Blood pressure as taken in the office today is within normal limits (<120/<80). - Cervical cancer screening has been discussed. Patient has a visit set up with GYN in July 2024. - EKG performed in the office today is within normal limits. - Breast cancer screening has been discussed at today's visit and mammogram script has been ordered. - Counseled on maintaining a healthy body weight. It has been estimated that up to one-third of cardiovascular disease deaths may be preventable by healthy diet and physical activity.   #Overweight - Consequences of obesity discussed with patient including increased risk of metabolic disease, CVD, SONALI, and sleep apnea. Pharmacological and non-pharmacological interventions were discussed with the patient today. Goal BMI is <25. Stressed importance of physical activity (at least 150 minutes of moderate intensity and 75 minutes of vigorous activity) Advised on the dangers of added sugars and advised to limit daily carbohydrate intake to 75 grams per day  #Left shoulder pain - PT script provided.  #Left 3rd digit pain - Diclofenac 3% gel prescribed to apply PRN.  #Hair loss - Obtain TFTs as well as blood work as above. Next step will depend on the results.

## 2024-05-28 ENCOUNTER — APPOINTMENT (OUTPATIENT)
Dept: INTERNAL MEDICINE | Facility: CLINIC | Age: 44
End: 2024-05-28
Payer: MEDICAID

## 2024-05-28 DIAGNOSIS — E78.2 MIXED HYPERLIPIDEMIA: ICD-10-CM

## 2024-05-28 LAB
25(OH)D3 SERPL-MCNC: 25 NG/ML
ALBUMIN SERPL ELPH-MCNC: 4.4 G/DL
ALP BLD-CCNC: 69 U/L
ALT SERPL-CCNC: 24 U/L
ANION GAP SERPL CALC-SCNC: 11 MMOL/L
APPEARANCE: CLEAR
AST SERPL-CCNC: 17 U/L
BASOPHILS # BLD AUTO: 0.04 K/UL
BASOPHILS NFR BLD AUTO: 0.6 %
BILIRUB SERPL-MCNC: 0.6 MG/DL
BILIRUBIN URINE: NEGATIVE
BLOOD URINE: NEGATIVE
BUN SERPL-MCNC: 15 MG/DL
CALCIUM SERPL-MCNC: 9.4 MG/DL
CHLORIDE SERPL-SCNC: 102 MMOL/L
CHOLEST SERPL-MCNC: 206 MG/DL
CO2 SERPL-SCNC: 25 MMOL/L
COLOR: YELLOW
CREAT SERPL-MCNC: 0.75 MG/DL
EGFR: 102 ML/MIN/1.73M2
EOSINOPHIL # BLD AUTO: 0.14 K/UL
EOSINOPHIL NFR BLD AUTO: 1.9 %
ESTIMATED AVERAGE GLUCOSE: 108 MG/DL
GLUCOSE QUALITATIVE U: NEGATIVE MG/DL
GLUCOSE SERPL-MCNC: 82 MG/DL
HBA1C MFR BLD HPLC: 5.4 %
HCT VFR BLD CALC: 41.1 %
HCV AB SER QL: NONREACTIVE
HCV S/CO RATIO: 0.12 S/CO
HDLC SERPL-MCNC: 45 MG/DL
HGB BLD-MCNC: 13 G/DL
HIV1+2 AB SPEC QL IA.RAPID: NONREACTIVE
IMM GRANULOCYTES NFR BLD AUTO: 0.3 %
KETONES URINE: NEGATIVE MG/DL
LDLC SERPL CALC-MCNC: 142 MG/DL
LEUKOCYTE ESTERASE URINE: NEGATIVE
LYMPHOCYTES # BLD AUTO: 2.52 K/UL
LYMPHOCYTES NFR BLD AUTO: 35 %
MAN DIFF?: NORMAL
MCHC RBC-ENTMCNC: 28.8 PG
MCHC RBC-ENTMCNC: 31.6 GM/DL
MCV RBC AUTO: 90.9 FL
MONOCYTES # BLD AUTO: 0.44 K/UL
MONOCYTES NFR BLD AUTO: 6.1 %
NEUTROPHILS # BLD AUTO: 4.04 K/UL
NEUTROPHILS NFR BLD AUTO: 56.1 %
NITRITE URINE: NEGATIVE
NONHDLC SERPL-MCNC: 161 MG/DL
PH URINE: 6.5
PLATELET # BLD AUTO: 300 K/UL
POTASSIUM SERPL-SCNC: 4.1 MMOL/L
PROT SERPL-MCNC: 7 G/DL
PROTEIN URINE: NEGATIVE MG/DL
RBC # BLD: 4.52 M/UL
RBC # FLD: 13.9 %
SODIUM SERPL-SCNC: 138 MMOL/L
SPECIFIC GRAVITY URINE: 1.03
TRIGL SERPL-MCNC: 105 MG/DL
TSH SERPL-ACNC: 1.16 UIU/ML
UROBILINOGEN URINE: 0.2 MG/DL
VIT B12 SERPL-MCNC: 709 PG/ML
WBC # FLD AUTO: 7.2 K/UL

## 2024-05-28 PROCEDURE — 99214 OFFICE O/P EST MOD 30 MIN: CPT

## 2024-05-28 NOTE — PLAN
[FreeTextEntry1] : #Mixed HLD - Calculated ASCVD risk 0.6% therefore there is no indication for statin at this time. Patient was counseled extensively on making lifestyle changes which include increasing physical activity and decreasing intake of saturated fat. Plan to repeat lipid panel in 3 month.

## 2024-07-10 NOTE — ASU PATIENT PROFILE, ADULT - WILL THE PATIENT ACCEPT THE PFIZER COVID-19 VACCINE IF ELIGIBLE AND IT IS AVAILABLE?
Firelands Regional Not applicable

## 2024-08-14 ENCOUNTER — APPOINTMENT (OUTPATIENT)
Dept: INTERNAL MEDICINE | Facility: CLINIC | Age: 44
End: 2024-08-14

## (undated) DEVICE — GLV 8 PROTEXIS (BLUE)

## (undated) DEVICE — DRAPE 1/2 SHEET 40X57"

## (undated) DEVICE — GLV 7.5 PROTEXIS (WHITE)

## (undated) DEVICE — DRSG CURITY GAUZE SPONGE 4 X 4" 12-PLY

## (undated) DEVICE — SUT CONMED SUTURE SAVER KIT

## (undated) DEVICE — NDL SPINAL 18G X 3.5" (PINK)

## (undated) DEVICE — BASIN SET DOUBLE

## (undated) DEVICE — CANNULA LINVATEC CLEAR FLEXIBLE 6.5X73MM

## (undated) DEVICE — DRAPE TOWEL BLUE 17" X 24"

## (undated) DEVICE — ARTHREX MULTIFIRE SCORPION NEEDLE

## (undated) DEVICE — DRSG WEBRIL 4"

## (undated) DEVICE — ARTHREX STAR SLEEVE W VELCRO

## (undated) DEVICE — GOWN XL EXTRA LONG

## (undated) DEVICE — ARTHREX STAR SLEEVE COBAN

## (undated) DEVICE — SUT FIBERWIRE #2 38" STRAND 1 BLUE 1 WHITE/BLACK

## (undated) DEVICE — PACK SHOULDER ARTHROSCOPY

## (undated) DEVICE — DRSG WEBRIL 6"

## (undated) DEVICE — S&N ARTHROCARE WAND TURBOVAC 90 DEGREE

## (undated) DEVICE — SUT PASSER ARTHROCARE FIRST PASS ST SELF CAPTURE

## (undated) DEVICE — SHAVER BLADE S&N FULL RADIUS 5.5MM STRAIGHT (ORANGE)

## (undated) DEVICE — HOOD FLYTE STRYKER HELMET SHIELD

## (undated) DEVICE — BLADE SCALPEL SAFETYLOCK #11

## (undated) DEVICE — NDL HYPO SAFE 18G X 1.5" (PINK)

## (undated) DEVICE — SUT ULTRATAPE COBRAID BLU

## (undated) DEVICE — TUBING LINVATEC ARTHROSCOPY IN/OUTFLOW

## (undated) DEVICE — BLADE SCALPEL SAFETYLOCK #15

## (undated) DEVICE — NSA-STRYKER VIDEO TOWER: Type: DURABLE MEDICAL EQUIPMENT

## (undated) DEVICE — Device

## (undated) DEVICE — SUT PDS II 1 27" CT

## (undated) DEVICE — SHAVER BLADE S&N FULL RADIUS 4.5MM STRAIGHT (YELLOW)

## (undated) DEVICE — DRAPE SPLIT SHEET 77" X 108"

## (undated) DEVICE — DRAPE SURGICAL #1010

## (undated) DEVICE — SPECIMEN CONTAINER 100ML

## (undated) DEVICE — NDL HYPO REGULAR BEVEL 22G X 1.5" (TURQUOISE)

## (undated) DEVICE — SHAVER BLADE S&N ORBIT INCISOR 4.5MM (LIME GREEN)

## (undated) DEVICE — SUT MAXON 1 36" GS-24

## (undated) DEVICE — DRAPE U 47X51" LF STERILE

## (undated) DEVICE — SUT PASSER CONMED SUPERSHUTTLE

## (undated) DEVICE — SHAVER BLADE S&N INCISOR 4.5MM STRAIGHT (LIME GREEN)

## (undated) DEVICE — SUT ULTRATAPE 2MM BLUE

## (undated) DEVICE — PREP CHLORAPREP HI-LITE ORANGE 26ML

## (undated) DEVICE — LAP PAD 18 X 18"

## (undated) DEVICE — SOL IRR BAG NS 0.9% 3000ML

## (undated) DEVICE — WARMING BLANKET LOWER ADULT

## (undated) DEVICE — ELCTR BOVIE TIP CLEANER SCRATCH PAD

## (undated) DEVICE — BUR S&N NOTCHBLASTER ELITE 4MM STRAIGHT (LILAC)

## (undated) DEVICE — BLADE SCALPEL SAFETYLOCK #10

## (undated) DEVICE — DRSG STERISTRIPS 0.5 X 4"

## (undated) DEVICE — DRAPE INSTRUMENT POUCH 6.75" X 11"

## (undated) DEVICE — DRAPE IOBAN 23" X 23"

## (undated) DEVICE — DRSG COBAN 6"

## (undated) DEVICE — DRAPE 3/4 SHEET W REINFORCEMENT 56X77"

## (undated) DEVICE — ELCTR BOVIE PENCIL HANDPIECE

## (undated) DEVICE — BUR S&N STONECUTTER ELITE 4MM STRAIGHT (MAROON)

## (undated) DEVICE — SHAVER BLADE S&N FULL RADIUS BONECUTTER PLATINUM 4.5MM (YELLOW)

## (undated) DEVICE — DRSG COMBINE 5X9"

## (undated) DEVICE — DRAPE MAYO STAND 30"

## (undated) DEVICE — SLV COMPRESSION KNEE MED

## (undated) DEVICE — TUBING SUCTION 20FT

## (undated) DEVICE — CANNULA DRY-DOC 7X95MM